# Patient Record
(demographics unavailable — no encounter records)

---

## 2025-06-25 NOTE — CARDIOLOGY SUMMARY
[Today's Date] : [unfilled] [FreeTextEntry2] : Right arch with aberrance left subclavian artery. Normal intracardiac anatomy. 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 No patent ductus arteriosus. No obvious airway narrowing. [de-identified] : 9/12/2024

## 2025-06-25 NOTE — DISCUSSION/SUMMARY
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 His physical examination is normal, and electrocardiogram shows sinus rhythm. Echocardiography demonstrates a right-sided aortic arch with an aberrant left subclavian artery (LSCA), consistent with a vascular ring. Intracardiac anatomy is otherwise normal, with good biventricular systolic function and no pericardial effusion.  We discussed the implications of a vascular ring, particularly the potential for compression of the trachea and/or esophagus, which may lead to symptoms such as stridor, feeding difficulties, or recurrent respiratory infections. 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  In light of this finding, I recommended elective surgical repair to mitigate the risk of symptomatic progression or airway compromise. The rationale, surgical approach, and potential outcomes were reviewed with the family in detail.  Plan: - Refer to cardiothoracic surgery for elective repair of vascular ring with Kommerell diverticulum - Monitor for any signs of respiratory or feeding difficulties - No activity restrictions at this time - No SBE prophylaxis.  Please do not hesitate to contact me if you have any questions.   Gabriel Hammond MD, MS, FAAP, FACC Attending Physician, Pediatric Cardiology [facility] [address] [place] [address], [address] [place] [address] Office: [phone] Fax: [phone] Email: [email or web address]     I have spent 60 minutes of time on the encounter excluding separately reported services.

## 2025-06-25 NOTE — REVIEW OF SYSTEMS
[Nl] : no feeding issues at this time. [Acting Fussy] : not acting ~L fussy [Fever] : no fever [Wgt Loss (___ Lbs)] : no recent weight loss [Pallor] : not pale [Discharge] : no discharge [Redness] : no redness [Nasal Discharge] : no nasal discharge [Nasal Stuffiness] : no nasal congestion [Stridor] : no stridor [Cyanosis] : no cyanosis [Edema] : no edema [Diaphoresis] : not diaphoretic [Tachypnea] : not tachypneic [Wheezing] : no wheezing [Cough] : no cough [Being A Poor Eater] : not a poor eater [Vomiting] : no vomiting [Diarrhea] : no diarrhea [Decrease In Appetite] : appetite not decreased [Fainting (Syncope)] : no fainting [Dec Consciousness] :  no decrease in consciousness [Seizure] : no seizures [Hypotonicity (Flaccid)] : not hypotonic [Refusal to Bear Wgt] : normal weight bearing [Puffy Hands/Feet] : no hand/feet puffiness [Rash] : no rash [Hemangioma] : no hemangioma [Jaundice] : no jaundice [Wound problems] : no wound problems [Bruising] : no tendency for easy bruising [Swollen Glands] : no lymphadenopathy [Enlarged [place]] : the fontanelle was not enlarged [Hoarse Cry] : no hoarse cry [Failure To Thrive] : no failure to thrive [Penis Circumcised] : not circumcised [Undescended Testes] : no undescended testicle [Ambiguous Genitals] : genitals not ambiguous [Dec Urine Output] : no oliguria

## 2025-06-25 NOTE — HISTORY OF PRESENT ILLNESS
[FreeTextEntry1] : Dear Dr. SHERWIN ALMANZAR,   I had the pleasure of seeing your patient, CAMACHO GOOD, in my office today, 2025. As you know, Camacho is a [id number] male referred for evaluation of an arch anomaly, diagnosed prenatally as a right aortic arch. He was born full term via normal spontaneous vaginal delivery, and his  course was unremarkable. The diagnosis was confirmed by CT scan performed at [facility] on 2024, which showed a right aortic arch with an aberrant left subclavian artery originating from a dilated diverticulum of Kommerell. 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 There was no patent ductus arteriosus and no evidence of airway narrowing on imaging.  Clinically, Camacho has been doing well without symptoms suggestive of vascular ring compression. There is no history of stridor, frequent spit-up, feeding difficulties, or cyanosis. No family history of congenital heart disease, sudden or unexplained death, or known genetic syndromes.